# Patient Record
(demographics unavailable — no encounter records)

---

## 2025-07-09 NOTE — HISTORY OF PRESENT ILLNESS
[de-identified] : 65-year-old female (actual age 72) referred by Dr. Gavin Donaldson for evaluation of fatty liver, abnormal liver tests, and cirrhosis. The patient has been aware of her fatty liver condition for approximately 2-3 years. Dr. Donaldson previously performed an ultrasound of the liver, possibly last year. The patient has been advised to lose weight and has successfully lost about 10 pounds. She denies any hospitalizations due to liver issues. The patient reports no specific symptoms related to her liver condition. She has been avoiding juices and vitamins for her liver as per Dr. Donaldson's advice. No previous liver-specific treatments have been initiated. No hx or sx of liver decompensation. - Past Medical History : Hypertension, managed with low-dose medication (10 mg). Vertigo, treated as needed. - Past Surgical History : No significant surgical history reported. - Family History : - Hypertension  - Tuberculosis  - Social History : - Born in the [facility]  - Immigrated to the [place] in 1990  - Lives with   - Former occupation: Housecleaning  - Minimal alcohol consumption (occasional taste, never finishes a full drink)  - No smoking history  - No illicit drug use  - One tattoo obtained in the [place]  - No history of blood transfusions  - Review of Systems : - General : Reports weight loss of about 10 pounds recently. - Neurological : History of vertigo. - Musculoskeletal : No significant issues reported. - Cardiovascular : Hypertension, well-controlled with medication. - Respiratory : Possible snoring, no formal evaluation for sleep apnea. - Gastrointestinal : Reports excessive gas. - Genitourinary : No issues reported. - Integumentary : No issues reported. - Psychiatric : No issues reported. - Medications : - Lisinopril for hypertension  - Medication for vertigo (as needed, name not specified)  - Allergies : No known drug allergies. Objective: - Diagnostic Results : Previous ultrasound of the liver performed by Dr. Donaldson, results not available during this consultation.

## 2025-07-09 NOTE — ASSESSMENT
[FreeTextEntry1] : 65-year-old female (actual age 72) referred by Dr. Gavin Donaldson for evaluation of fatty liver, abnormal liver tests, and cirrhosis.  No  Imaging and labs available . Following issues were d/w pt and  in detail. has risk factors for MASLD  Patient is aware that they have several components of the metabolic syndrome including weight gain during adulthood. The benefit of a low glycemic diet and exercise were discussed. The patient needs improved treatment of diabetes, HTN, elevated cholesterol as appropriate. Long term sequelae of Fatty liver disease including progression to decompensated cirrhosis and hcc explained to pt.  The utility of nutrition consult explained. The role of liver biopsy also discussed.  Patient demonstrated understanding. Will order fibroscan, Factors associated with unreliable results included BMI >30 kg/m2, age >52 years, female sex,  inexperience, and type 2 diabetes mellitus discussed. MRE is also a consideration after fibroscan Will also order chronic liver disease w/u and other imaging as needed Role of GLP1a, if indicated and approved by insurance, d/w pt  - Order new ultrasound of the liver to assess current status  - Schedule FibroScan to evaluate liver fibrosis and potential cirrhosis  - Conduct comprehensive blood work to rule out other liver conditions and assess overall health  - Provide dietary counseling, emphasizing reduction in carbohydrate intake and gas-producing foods  - Encourage continued weight loss efforts through diet and exercise  - Recommend sleep apnea evaluation due to reported snoring -avoid etoh  - Follow-up appointment to be scheduled after completion of diagnostic tests - Provide detailed dietary guidance, focusing on reducing carbohydrate intake  - Encourage regular exercise, praising current gym attendance f.u with PMD for VANESSA r/o follow up conversation after above w/u and the determine f/u if needed I spent total of 50 minutes on this patient encounter. All questions answered, demonstrated understanding

## 2025-07-09 NOTE — PHYSICAL EXAM
[General Appearance - Alert] : alert [Sclera] : the sclera and conjunctiva were normal [Both Tympanic Membranes Were Examined] : both tympanic membranes were normal [] : no respiratory distress [Respiration, Rhythm And Depth] : normal respiratory rhythm and effort [Auscultation Breath Sounds / Voice Sounds] : lungs were clear to auscultation bilaterally [Heart Rate And Rhythm] : heart rate was normal and rhythm regular [Heart Sounds] : normal S1 and S2 [Edema] : there was no peripheral edema [Bowel Sounds] : normal bowel sounds [Abdomen Soft] : soft [Abdomen Tenderness] : non-tender [Oriented To Time, Place, And Person] : oriented to person, place, and time